# Patient Record
Sex: MALE | Race: WHITE | NOT HISPANIC OR LATINO
[De-identification: names, ages, dates, MRNs, and addresses within clinical notes are randomized per-mention and may not be internally consistent; named-entity substitution may affect disease eponyms.]

---

## 2022-08-26 ENCOUNTER — TRANSCRIPTION ENCOUNTER (OUTPATIENT)
Age: 37
End: 2022-08-26

## 2022-10-18 ENCOUNTER — NON-APPOINTMENT (OUTPATIENT)
Age: 37
End: 2022-10-18

## 2022-10-18 ENCOUNTER — APPOINTMENT (OUTPATIENT)
Dept: HEART AND VASCULAR | Facility: CLINIC | Age: 37
End: 2022-10-18

## 2022-10-18 VITALS
HEART RATE: 76 BPM | OXYGEN SATURATION: 98 % | WEIGHT: 205 LBS | TEMPERATURE: 98 F | HEIGHT: 73 IN | SYSTOLIC BLOOD PRESSURE: 130 MMHG | BODY MASS INDEX: 27.17 KG/M2 | DIASTOLIC BLOOD PRESSURE: 88 MMHG

## 2022-10-18 PROBLEM — Z00.00 ENCOUNTER FOR PREVENTIVE HEALTH EXAMINATION: Status: ACTIVE | Noted: 2022-10-18

## 2022-10-18 PROCEDURE — 93000 ELECTROCARDIOGRAM COMPLETE: CPT

## 2022-10-18 PROCEDURE — 99203 OFFICE O/P NEW LOW 30 MIN: CPT | Mod: 25

## 2022-11-04 NOTE — HISTORY OF PRESENT ILLNESS
[FreeTextEntry1] : 36 yo male hyperlipidemia presented 8/2022 with palpitations.  Noted high heart rate on apple watch.  Few episodes over the years, which always occur when he's driving home.  Notes increased stress.\par He initially felt a flutter in his chest then heart rate went up to 150 on his watch.  He tried to relax, it came down in a few minutes, but went back to 110.  It was ok by the time he got to the hospital.  One recording seen from apple watch since that time with a , unclear rhythm.\par Symptoms have improved since that time.  He otherwise feels good.  Denies chest pain or dyspnea with exertion, palpitations, dizziness, or syncope.\par Denies family history of arrhythmia.\par  \par EKG 8/25/2022: sinus rhythm, normal EKG\par Echo 8/26/2022: normal LV function, no sig valve disease\par EKG 10/18/2022: sinus rhythm\par 
yes

## 2022-11-04 NOTE — REVIEW OF SYSTEMS
[Fever] : no fever [Chills] : no chills [SOB] : no shortness of breath [Dyspnea on exertion] : not dyspnea during exertion [Chest Discomfort] : no chest discomfort [Palpitations] : no palpitations [Syncope] : no syncope [Cough] : no cough [Abdominal Pain] : no abdominal pain [Nausea] : no nausea [Vomiting] : no vomiting [Joint Pain] : no joint pain [Rash] : no rash [Dizziness] : no dizziness [Confusion] : no confusion was observed

## 2022-11-29 ENCOUNTER — APPOINTMENT (OUTPATIENT)
Dept: HEART AND VASCULAR | Facility: CLINIC | Age: 37
End: 2022-11-29

## 2022-11-29 VITALS
HEART RATE: 75 BPM | WEIGHT: 210 LBS | SYSTOLIC BLOOD PRESSURE: 130 MMHG | OXYGEN SATURATION: 98 % | DIASTOLIC BLOOD PRESSURE: 80 MMHG | BODY MASS INDEX: 27.83 KG/M2 | HEIGHT: 73 IN

## 2022-11-29 DIAGNOSIS — R00.2 PALPITATIONS: ICD-10-CM

## 2022-11-29 PROCEDURE — 99213 OFFICE O/P EST LOW 20 MIN: CPT

## 2022-12-05 PROBLEM — R00.2 PALPITATIONS: Status: ACTIVE | Noted: 2022-11-04

## 2022-12-05 NOTE — HISTORY OF PRESENT ILLNESS
[FreeTextEntry1] : 36 yo male hyperlipidemia presented 8/2022 with palpitations.  Noted high heart rate on apple watch.  Few episodes over the years, which always occur when he's driving home.  Notes increased stress.\par He initially felt a flutter in his chest then heart rate went up to 150 on his watch.  He tried to relax, it came down in a few minutes, but went back to 110.  It was ok by the time he got to the hospital.  One recording seen from apple watch since that time with a , unclear rhythm.\par Symptoms have improved since that time.  He otherwise feels good.  Denies chest pain or dyspnea with exertion, palpitations, dizziness, or syncope.\par Denies family history of arrhythmia.\par He had worsening symptoms and wore a 2 week event monitor.  This showed rare ectopy and no significant arrhythmias.  The times of his symptoms did not clearly correlate to any arrhythmia.\par \par EKG 8/25/2022: sinus rhythm, normal EKG\par Echo 8/26/2022: normal LV function, no sig valve disease\par EKG 10/18/2022: sinus rhythm\par